# Patient Record
Sex: FEMALE | Race: WHITE | NOT HISPANIC OR LATINO | URBAN - METROPOLITAN AREA
[De-identification: names, ages, dates, MRNs, and addresses within clinical notes are randomized per-mention and may not be internally consistent; named-entity substitution may affect disease eponyms.]

---

## 2017-04-08 ENCOUNTER — INPATIENT (INPATIENT)
Facility: HOSPITAL | Age: 27
LOS: 0 days | Discharge: HOME | DRG: 743 | End: 2017-04-09
Attending: OBSTETRICS & GYNECOLOGY | Admitting: OBSTETRICS & GYNECOLOGY
Payer: COMMERCIAL

## 2017-04-08 VITALS
TEMPERATURE: 98 F | WEIGHT: 199.96 LBS | DIASTOLIC BLOOD PRESSURE: 87 MMHG | HEIGHT: 65 IN | OXYGEN SATURATION: 98 % | RESPIRATION RATE: 18 BRPM | SYSTOLIC BLOOD PRESSURE: 144 MMHG | HEART RATE: 76 BPM

## 2017-04-08 LAB
ALBUMIN SERPL ELPH-MCNC: 3.8 G/DL — SIGNIFICANT CHANGE UP (ref 3.4–5)
ALP SERPL-CCNC: 87 U/L — SIGNIFICANT CHANGE UP (ref 40–120)
ALT FLD-CCNC: 22 U/L — SIGNIFICANT CHANGE UP (ref 12–42)
ANION GAP SERPL CALC-SCNC: 7 MMOL/L — LOW (ref 9–16)
AST SERPL-CCNC: 17 U/L — SIGNIFICANT CHANGE UP (ref 15–37)
BASOPHILS NFR BLD AUTO: 0.2 % — SIGNIFICANT CHANGE UP (ref 0–2)
BILIRUB SERPL-MCNC: 0.3 MG/DL — SIGNIFICANT CHANGE UP (ref 0.2–1.2)
BUN SERPL-MCNC: 7 MG/DL — SIGNIFICANT CHANGE UP (ref 7–23)
CALCIUM SERPL-MCNC: 9 MG/DL — SIGNIFICANT CHANGE UP (ref 8.5–10.5)
CHLORIDE SERPL-SCNC: 106 MMOL/L — SIGNIFICANT CHANGE UP (ref 96–108)
CO2 SERPL-SCNC: 27 MMOL/L — SIGNIFICANT CHANGE UP (ref 22–31)
CREAT SERPL-MCNC: 0.73 MG/DL — SIGNIFICANT CHANGE UP (ref 0.5–1.3)
EOSINOPHIL NFR BLD AUTO: 0.2 % — SIGNIFICANT CHANGE UP (ref 0–6)
EXTRA BLUE TOP TUBE: SIGNIFICANT CHANGE UP
GLUCOSE SERPL-MCNC: 96 MG/DL — SIGNIFICANT CHANGE UP (ref 70–99)
HCT VFR BLD CALC: 39.4 % — SIGNIFICANT CHANGE UP (ref 34.5–45)
HGB BLD-MCNC: 13.6 G/DL — SIGNIFICANT CHANGE UP (ref 11.5–15.5)
LIDOCAIN IGE QN: 108 U/L — SIGNIFICANT CHANGE UP (ref 73–393)
LYMPHOCYTES # BLD AUTO: 16.4 % — SIGNIFICANT CHANGE UP (ref 13–44)
MCHC RBC-ENTMCNC: 30.6 PG — SIGNIFICANT CHANGE UP (ref 27–34)
MCHC RBC-ENTMCNC: 34.5 G/DL — SIGNIFICANT CHANGE UP (ref 32–36)
MCV RBC AUTO: 88.5 FL — SIGNIFICANT CHANGE UP (ref 80–100)
MONOCYTES NFR BLD AUTO: 7.9 % — SIGNIFICANT CHANGE UP (ref 2–14)
NEUTROPHILS NFR BLD AUTO: 75.3 % — SIGNIFICANT CHANGE UP (ref 43–77)
PLATELET # BLD AUTO: 345 K/UL — SIGNIFICANT CHANGE UP (ref 150–400)
POTASSIUM SERPL-MCNC: 4.1 MMOL/L — SIGNIFICANT CHANGE UP (ref 3.5–5.3)
POTASSIUM SERPL-SCNC: 4.1 MMOL/L — SIGNIFICANT CHANGE UP (ref 3.5–5.3)
PROT SERPL-MCNC: 7.5 G/DL — SIGNIFICANT CHANGE UP (ref 6.4–8.2)
RBC # BLD: 4.45 M/UL — SIGNIFICANT CHANGE UP (ref 3.8–5.2)
RBC # FLD: 12.7 % — SIGNIFICANT CHANGE UP (ref 10.3–16.9)
SODIUM SERPL-SCNC: 140 MMOL/L — SIGNIFICANT CHANGE UP (ref 135–145)
WBC # BLD: 10.8 K/UL — HIGH (ref 3.8–10.5)
WBC # FLD AUTO: 10.8 K/UL — HIGH (ref 3.8–10.5)

## 2017-04-08 PROCEDURE — 76830 TRANSVAGINAL US NON-OB: CPT | Mod: 26

## 2017-04-08 PROCEDURE — 99285 EMERGENCY DEPT VISIT HI MDM: CPT

## 2017-04-08 PROCEDURE — 76856 US EXAM PELVIC COMPLETE: CPT | Mod: 26

## 2017-04-08 RX ORDER — SODIUM CHLORIDE 9 MG/ML
1000 INJECTION INTRAMUSCULAR; INTRAVENOUS; SUBCUTANEOUS ONCE
Qty: 0 | Refills: 0 | Status: COMPLETED | OUTPATIENT
Start: 2017-04-08 | End: 2017-04-08

## 2017-04-08 RX ORDER — ONDANSETRON 8 MG/1
4 TABLET, FILM COATED ORAL ONCE
Qty: 0 | Refills: 0 | Status: COMPLETED | OUTPATIENT
Start: 2017-04-08 | End: 2017-04-08

## 2017-04-08 RX ORDER — METOCLOPRAMIDE HCL 10 MG
10 TABLET ORAL ONCE
Qty: 0 | Refills: 0 | Status: DISCONTINUED | OUTPATIENT
Start: 2017-04-08 | End: 2017-04-09

## 2017-04-08 RX ORDER — KETOROLAC TROMETHAMINE 30 MG/ML
30 SYRINGE (ML) INJECTION EVERY 6 HOURS
Qty: 0 | Refills: 0 | Status: DISCONTINUED | OUTPATIENT
Start: 2017-04-08 | End: 2017-04-09

## 2017-04-08 RX ORDER — HYDROMORPHONE HYDROCHLORIDE 2 MG/ML
1 INJECTION INTRAMUSCULAR; INTRAVENOUS; SUBCUTANEOUS ONCE
Qty: 0 | Refills: 0 | Status: DISCONTINUED | OUTPATIENT
Start: 2017-04-08 | End: 2017-04-08

## 2017-04-08 RX ORDER — MORPHINE SULFATE 50 MG/1
4 CAPSULE, EXTENDED RELEASE ORAL EVERY 4 HOURS
Qty: 0 | Refills: 0 | Status: DISCONTINUED | OUTPATIENT
Start: 2017-04-08 | End: 2017-04-09

## 2017-04-08 RX ORDER — OXYCODONE HYDROCHLORIDE 5 MG/1
1 TABLET ORAL
Qty: 20 | Refills: 0 | OUTPATIENT
Start: 2017-04-08 | End: 2017-04-13

## 2017-04-08 RX ORDER — ARIPIPRAZOLE 15 MG/1
1 TABLET ORAL
Qty: 0 | Refills: 0 | COMMUNITY

## 2017-04-08 RX ORDER — KETOROLAC TROMETHAMINE 30 MG/ML
30 SYRINGE (ML) INJECTION ONCE
Qty: 0 | Refills: 0 | Status: DISCONTINUED | OUTPATIENT
Start: 2017-04-08 | End: 2017-04-09

## 2017-04-08 RX ORDER — LAMOTRIGINE 25 MG/1
1 TABLET, ORALLY DISINTEGRATING ORAL
Qty: 0 | Refills: 0 | COMMUNITY

## 2017-04-08 RX ORDER — SODIUM CHLORIDE 9 MG/ML
1000 INJECTION INTRAMUSCULAR; INTRAVENOUS; SUBCUTANEOUS
Qty: 0 | Refills: 0 | Status: DISCONTINUED | OUTPATIENT
Start: 2017-04-08 | End: 2017-04-08

## 2017-04-08 RX ORDER — ONDANSETRON 8 MG/1
4 TABLET, FILM COATED ORAL EVERY 6 HOURS
Qty: 0 | Refills: 0 | Status: DISCONTINUED | OUTPATIENT
Start: 2017-04-08 | End: 2017-04-09

## 2017-04-08 RX ORDER — NORETHINDRONE AND ETHINYL ESTRADIOL 0.4-0.035
1 KIT ORAL
Qty: 0 | Refills: 0 | COMMUNITY

## 2017-04-08 RX ORDER — MORPHINE SULFATE 50 MG/1
4 CAPSULE, EXTENDED RELEASE ORAL ONCE
Qty: 0 | Refills: 0 | Status: DISCONTINUED | OUTPATIENT
Start: 2017-04-08 | End: 2017-04-08

## 2017-04-08 RX ADMIN — HYDROMORPHONE HYDROCHLORIDE 1 MILLIGRAM(S): 2 INJECTION INTRAMUSCULAR; INTRAVENOUS; SUBCUTANEOUS at 16:32

## 2017-04-08 RX ADMIN — ONDANSETRON 4 MILLIGRAM(S): 8 TABLET, FILM COATED ORAL at 16:32

## 2017-04-08 RX ADMIN — ONDANSETRON 4 MILLIGRAM(S): 8 TABLET, FILM COATED ORAL at 14:58

## 2017-04-08 RX ADMIN — Medication 30 MILLIGRAM(S): at 18:22

## 2017-04-08 RX ADMIN — MORPHINE SULFATE 4 MILLIGRAM(S): 50 CAPSULE, EXTENDED RELEASE ORAL at 16:32

## 2017-04-08 RX ADMIN — Medication 30 MILLIGRAM(S): at 19:05

## 2017-04-08 RX ADMIN — SODIUM CHLORIDE 2000 MILLILITER(S): 9 INJECTION INTRAMUSCULAR; INTRAVENOUS; SUBCUTANEOUS at 14:58

## 2017-04-08 RX ADMIN — MORPHINE SULFATE 4 MILLIGRAM(S): 50 CAPSULE, EXTENDED RELEASE ORAL at 14:58

## 2017-04-08 RX ADMIN — ONDANSETRON 4 MILLIGRAM(S): 8 TABLET, FILM COATED ORAL at 18:22

## 2017-04-08 NOTE — H&P ADULT - NSHPLABSRESULTS_GEN_ALL_CORE
Comprehensive Metabolic Panel (04.08.17 @ 14:35)    Sodium, Serum: 140 mmoL/L    Potassium, Serum: 4.1 mmoL/L    Chloride, Serum: 106 mmoL/L    Carbon Dioxide, Serum: 27 mmoL/L    Anion Gap, Serum: 7 mmoL/L    Blood Urea Nitrogen, Serum: 7 mg/dL    Creatinine, Serum: 0.73 mg/dL    Glucose, Serum: 96 mg/dL    Calcium, Total Serum: 9.0 mg/dL    Protein Total, Serum: 7.5 g/dL    Albumin, Serum: 3.8 g/dL    Bilirubin Total, Serum: 0.3 mg/dL    Alkaline Phosphatase, Serum: 87 U/L    Aspartate Aminotransferase (AST/SGOT): 17 U/L    Alanine Aminotransferase (ALT/SGPT): 22 U/L    Complete Blood Count + Automated Diff (04.08.17 @ 14:35)    WBC Count: 10.8 K/uL    RBC Count: 4.45 M/uL    Hemoglobin: 13.6 g/dL    Hematocrit: 39.4 %    Mean Cell Volume: 88.5 fL    Mean Cell Hemoglobin: 30.6 pg    Mean Cell Hemoglobin Conc: 34.5 g/dL    Red Cell Distrib Width: 12.7 %    Platelet Count - Automated: 345 K/uL    Auto Neutrophil %: 75.3: Differential percentages must be correlated with absolute numbers for  clinical significance. %    Auto Lymphocyte %: 16.4 %    Auto Monocyte %: 7.9 %    Auto Eosinophil %: 0.2 %    Auto Basophil %: 0.2 %    EXAM:  US PELVIC COMPLETE                          EXAM:  US TRANSVAGINAL                          PROCEDURE DATE:  04/08/2017                     INTERPRETATION:    PELVIC ULTRASOUND dated 4/8/2017 3:47 PM    INDICATION: Right lower quadrant pain and vomiting. History of right   ovarian torsion. LMP: 4/5/2017.    TECHNIQUE: Transabdominal views of the pelvis were obtained followed by   transvaginal views for better visualization of the ovaries.      PRIOR STUDIES: Pelvic ultrasound 1/28/2015    FINDINGS:   These images demonstrate the uterus to be anteverted. The uterus is   normal in size and shape.  The uterus is 10.2 x 6.1 x 7.8 cm.  Limited   evaluation of the endometrium due to patient unable to tolerate exam due   to pain .    The right ovary is markedly increased in size from the previous   examination, measuring 8.3 x 4.7 x 8.2 cm with minimal flow demonstrated   on Doppler evaluation. The right ovary is heterogeneous in appearance   with a few peripheral follicles seen. The left ovary is normal in size,   measuring 5.2 x 3.5 x 1.9 cm. No left ovarian masses are seen. Doppler   evaluation demonstrates flow to left ovary with no evidence of torsion.    No free fluid is seen in the cul-de-sac.      IMPRESSION:   Markedly enlarged and heterogeneous right ovary with minimal flow.   Despite visualized blood flow, these findings are consistent with partial   or intermittent torsion of the right ovary.    Findings discussed with Dr. Powell on 4/8/2017 3:55 PM with read back   verifications    "Thank you for the opportunity to participate in the care of this   patient."    IHSAN CALDWELL M.D., RADIOLOGY RESIDENT  This document has been electronically signed.  CANDY FITZGERALD M.D., ATTENDING RADIOLOGIST  This document has been electronically signed. Apr 8 2017  4:08PM

## 2017-04-08 NOTE — DISCHARGE NOTE ADULT - CARE PROVIDER_API CALL
Nick Rhodes), Obstetrics and Gynecology  [address] [place] [address]  Phone: [phone]  Fax: [phone]

## 2017-04-08 NOTE — ED ADULT TRIAGE NOTE - CHIEF COMPLAINT QUOTE
Pt CO RLQ Pain 10/10 associated vomiting since Thursday.  Pt seen by OBGYN yesterday with non remarkable US performed.  Pt denies Diarrhea, SOB, Fevers, chills, and Dysuria

## 2017-04-08 NOTE — H&P ADULT - HISTORY OF PRESENT ILLNESS
Pt is a 23 y/o  who presents with acute LLQ pain. Pain started acutely 2017 associated with severe pain and nausea. Patient saw her OB/GYN  and was told that there was an ovarian cyst, but no evidence of torsion. Pain did not improve or resolve and patient saw her OB again today. Based on evaluation, pt was sent to the hospital today.     Currently pain is 10/10. There is no comfortable position. She continues to have nausea, clear fluid. She is not tolerating PO and did not have dinner or breakfast. She had a small amount of orange juice this morning, which she vomited up.   Patient with a history of ovarian cyst one year ago. Patient experienced similiar pain at that time, but it did not last as long. She was started on OCP, but no repeat ultrasounds were done to follow the cyst.

## 2017-04-08 NOTE — ED PROVIDER NOTE - OBJECTIVE STATEMENT
27 year old female with history of psoriasis, ovarian cysts h/o ovarian torsion (not requiring surgery) presenting with RLQ abdominal pain. Patient states pain started Thursday night, two nights ago. The pain was very severe when it initially started, and associated with nausea and vomiting. She went to her ObGyn's office yesterday for the pain and had a transvaginal ultrasound which showed an enlarged right ovary without doppler evidence of torsion. The pain, however, has continued, as has the nausea and vomiting. She currently vomits about one hour after eating, any time she eats or drinks. She states the pain is a 10/10 in her lower right abdomen, radiating slightly to her right lower back. She denies any constipation, diarrhea, dysuria, change in urinary frequency. She is currently sexually active with one male partner, uses OCPs and condoms consistently. Her last menses finished 3 days ago. She denies any change in activity or diet. Denies chest pain, palpitations, SOB, dizziness, blurry vision.

## 2017-04-08 NOTE — ED PROVIDER NOTE - ATTENDING CONTRIBUTION TO CARE
26 yo female with hx of right ovarian cyst-now with 3 day hx of RLQ pain- recent TV sono 1 day ago at outpatient clinic- apparently demonstrated flow to right ovary- now with increased oain in RLQ  N/V  - TV sono large 7x8x 7 cm right ovarian mass - min flow--likely torsion-- GYN consulted to OR for exploration

## 2017-04-08 NOTE — ED PROVIDER NOTE - ENMT, MLM
Airway patent, Nasal mucosa clear. Throat has no vesicles, no oropharyngeal exudates and uvula is midline. Dry mucous membranes

## 2017-04-08 NOTE — DISCHARGE NOTE ADULT - CARE PLAN
Principal Discharge DX:	Ovarian torsion  Goal:	be happy  Instructions for follow-up, activity and diet:	resume activities of daily living, pelvic rest, follow-up with Dr. Rhodes in 2 weeks

## 2017-04-08 NOTE — DISCHARGE NOTE ADULT - MEDICATION SUMMARY - MEDICATIONS TO TAKE
I will START or STAY ON the medications listed below when I get home from the hospital:    acetaminophen-oxyCODONE 325 mg-5 mg oral tablet  -- 1 tab(s) by mouth every 6 hours MDD:do not exceed more than 4 tablets/day  -- Caution federal law prohibits the transfer of this drug to any person other  than the person for whom it was prescribed.  May cause drowsiness.  Alcohol may intensify this effect.  Use care when operating dangerous machinery.  This prescription cannot be refilled.  This product contains acetaminophen.  Do not use  with any other product containing acetaminophen to prevent possible liver damage.  Using more of this medication than prescribed may cause serious breathing problems.    -- Indication: For OVARIAN TORSION

## 2017-04-08 NOTE — H&P ADULT - NSHPPHYSICALEXAM_GEN_ALL_CORE
General: A&Ox3, cooperative, acute distress  CV: S1 &S2 physiologic, no murmurs, rubs or gallops  Pulm: CTA bilaterally   Abdomen: soft, non-distended, moderate LLQ pain to deep palpation, guarding, no rebound   Extremities: no calf tenderness or swelling

## 2017-04-08 NOTE — DISCHARGE NOTE ADULT - PATIENT PORTAL LINK FT
“You can access the [facility] Patient Portal, offered by [facility], by registering with the following website: [email or web address]”

## 2017-04-08 NOTE — ED PROVIDER NOTE - MEDICAL DECISION MAKING DETAILS
Presented with 10/10 RLQ pain, with history of ovarian torsion. Labs sent, ordered for transvaginal ultrasound to evaluate for possible torsion Presented with 10/10 RLQ pain, with history of ovarian torsion. Labs sent, ordered for transvaginal ultrasound to evaluate for possible torsion.  Ultrasound showed markedly enlarged and heterogeneous right ovary with minimal flow, consistent with partial or intermittent torsion of the right ovary.  GYN consulted. Presented with 10/10 RLQ pain, with history of ovarian torsion. Labs sent, ordered for transvaginal ultrasound to evaluate for possible torsion.  Ultrasound showed markedly enlarged and heterogeneous right ovary with minimal flow, consistent with partial or intermittent torsion of the right ovary.  GYN consulted, will take patient to OR.

## 2017-04-08 NOTE — ED PROVIDER NOTE - CONSTITUTIONAL, MLM
normal... Well nourished, awake, alert, oriented to person, place, time/situation. In mild distress due to pain

## 2017-04-08 NOTE — H&P ADULT - NSHPREVIEWOFSYSTEMS_GEN_ALL_CORE
No fevers, chills, chest pain or shortness of breath. No vaginal bleeding.   + nausea and vomiting.  + abdominal pain

## 2017-04-09 VITALS
RESPIRATION RATE: 17 BRPM | DIASTOLIC BLOOD PRESSURE: 67 MMHG | TEMPERATURE: 99 F | HEART RATE: 73 BPM | OXYGEN SATURATION: 97 % | SYSTOLIC BLOOD PRESSURE: 100 MMHG

## 2017-04-09 RX ORDER — FLUCONAZOLE 150 MG/1
150 TABLET ORAL ONCE
Qty: 0 | Refills: 0 | Status: COMPLETED | OUTPATIENT
Start: 2017-04-09 | End: 2017-04-09

## 2017-04-09 RX ORDER — IBUPROFEN 200 MG
600 TABLET ORAL EVERY 6 HOURS
Qty: 0 | Refills: 0 | Status: DISCONTINUED | OUTPATIENT
Start: 2017-04-09 | End: 2017-04-09

## 2017-04-09 RX ADMIN — FLUCONAZOLE 150 MILLIGRAM(S): 150 TABLET ORAL at 01:32

## 2017-04-09 NOTE — BRIEF OPERATIVE NOTE - PROCEDURE
Salpingoophorectomy, laparoscopic  04/09/2017  Right sided salpingo-ophorectomy for ovarian torsion  Active  ALITTLERIC

## 2017-04-09 NOTE — PROGRESS NOTE ADULT - ASSESSMENT
28 y/o POD1 after laparoscopic oophorectomy and salpingectomy for ovarian torsion.   1. Neuro: Pain controlled with PO pain meds  2. CV:   Heplock   3. Pulm: Patient encouraged to use incentive spirometer when awake. No issues   4. GI: regular diet.   5. : voiding. minimal lochia. s/p diflucan for yeast infection    6. DVT: SCDs and early ambulation

## 2017-04-09 NOTE — PROGRESS NOTE ADULT - SUBJECTIVE AND OBJECTIVE BOX
Subjective: No events overnight. Pain controlled. Patient tolerated PO. Ambulating to the restroom without issue.     Patient denies fevers, chills, chest pain, shortness of breath, nausea, vomiting, diarrhea or constipation     Vital Signs Last 24 Hrs  T(C): 37.1, Max: 37.1 (04-09 @ 05:38)  T(F): 98.8, Max: 98.8 (04-09 @ 05:38)  HR: 50 (50 - 81)  BP: 103/68 (103/68 - 144/87)  BP(mean): --  RR: 17 (16 - 18)  SpO2: 97% (95% - 100%)  I&O's Summary    MEDICATIONS  (STANDING):  ibuprofen  Tablet 600milliGRAM(s) Oral every 6 hours    MEDICATIONS  (PRN):  oxyCODONE  5 mG/acetaminophen 325 mG 1Tablet(s) Oral every 4 hours PRN Moderate Pain  morphine  - Injectable 4milliGRAM(s) IV Push every 4 hours PRN Severe Pain  ondansetron Injectable 4milliGRAM(s) IV Push every 6 hours PRN Nausea  metoclopramide Injectable 10milliGRAM(s) IV Push once PRN Nausea and/or Vomiting      PHYSICAL EXAM   Constitutional: Alert & Oriented x3, No acute distress, cooperative   Gastrointestinal: soft, mildly tender, mild distention, positive bowel sounds, no rebound or guarding   Incision: dry and intact, no erythema or induration.   Genitourinary: scant vaginal bleeding   Extremities: no calf tenderness or swelling    LABS:                        13.6   10.8  )-----------( 345      ( 08 Apr 2017 14:35 )             39.4     04-08    140  |  106  |  7   ----------------------------<  96  4.1   |  27  |  0.73    Ca    9.0      08 Apr 2017 14:35    TPro  7.5  /  Alb  3.8  /  TBili  0.3  /  DBili  x   /  AST  17  /  ALT  22  /  AlkPhos  87  04-08    PT/INR - ( 08 Apr 2017 14:35 )   PT: 11.0 sec;   INR: 0.99          PTT - ( 08 Apr 2017 14:35 )  PTT:26.4 sec

## 2017-04-10 PROCEDURE — 86900 BLOOD TYPING SEROLOGIC ABO: CPT

## 2017-04-10 PROCEDURE — 99285 EMERGENCY DEPT VISIT HI MDM: CPT | Mod: 25

## 2017-04-10 PROCEDURE — 76856 US EXAM PELVIC COMPLETE: CPT

## 2017-04-10 PROCEDURE — 86850 RBC ANTIBODY SCREEN: CPT

## 2017-04-10 PROCEDURE — 76830 TRANSVAGINAL US NON-OB: CPT

## 2017-04-10 PROCEDURE — 88307 TISSUE EXAM BY PATHOLOGIST: CPT

## 2017-04-10 PROCEDURE — 96375 TX/PRO/DX INJ NEW DRUG ADDON: CPT

## 2017-04-10 PROCEDURE — 96376 TX/PRO/DX INJ SAME DRUG ADON: CPT

## 2017-04-10 PROCEDURE — 85730 THROMBOPLASTIN TIME PARTIAL: CPT

## 2017-04-10 PROCEDURE — 83690 ASSAY OF LIPASE: CPT

## 2017-04-10 PROCEDURE — 85025 COMPLETE CBC W/AUTO DIFF WBC: CPT

## 2017-04-10 PROCEDURE — 96374 THER/PROPH/DIAG INJ IV PUSH: CPT

## 2017-04-10 PROCEDURE — 84702 CHORIONIC GONADOTROPIN TEST: CPT

## 2017-04-10 PROCEDURE — 86901 BLOOD TYPING SEROLOGIC RH(D): CPT

## 2017-04-10 PROCEDURE — 85610 PROTHROMBIN TIME: CPT

## 2017-04-10 PROCEDURE — 80053 COMPREHEN METABOLIC PANEL: CPT

## 2017-04-11 DIAGNOSIS — F32.9 MAJOR DEPRESSIVE DISORDER, SINGLE EPISODE, UNSPECIFIED: ICD-10-CM

## 2017-04-11 DIAGNOSIS — N85.4 MALPOSITION OF UTERUS: ICD-10-CM

## 2017-04-11 DIAGNOSIS — L40.9 PSORIASIS, UNSPECIFIED: ICD-10-CM

## 2017-04-11 DIAGNOSIS — R11.0 NAUSEA: ICD-10-CM

## 2017-04-11 DIAGNOSIS — R10.32 LEFT LOWER QUADRANT PAIN: ICD-10-CM

## 2017-04-11 DIAGNOSIS — N83.511 TORSION OF RIGHT OVARY AND OVARIAN PEDICLE: ICD-10-CM

## 2017-04-11 DIAGNOSIS — Z80.3 FAMILY HISTORY OF MALIGNANT NEOPLASM OF BREAST: ICD-10-CM

## 2017-04-12 LAB — SURGICAL PATHOLOGY STUDY: SIGNIFICANT CHANGE UP

## 2024-12-20 NOTE — H&P ADULT - ASSESSMENT
26 y/o with acute LLQ pain, concern for ovarian torsion   1. Neuro: Pain controlled with PO pain meds  2. CV:  LR @125cc/hr   Heplock   3. Pulm: Patient encouraged to use incentive spirometer when awake. No issues   4. GI:   5. : Donald   6. DVT: SCDs and early ambulation 28 y/o with acute LLQ pain, concern for ovarian torsion. Patient added on as a Cat 1 case, but told that there was no OR availability for 2 hours. Will continue to work closely with the OR to expedite surgery   1. Neuro: Pain controlled with Morphine PRN. Reglan or Zofran as needed for nausea.   2. CV:  LR @125cc/hr    3. Pulm: No issues   4. GI: NPO   5. : voiding    6. DVT: SCDs and early ambulation M[place]